# Patient Record
(demographics unavailable — no encounter records)

---

## 2025-02-24 NOTE — DVHOP2
Operative Report - 2


Report Details


Date:


2/24/25


Preop Diagnosis:


Right 1st 2nd 3rd and 5th toe dry gangrene, left 2nd and 3rd toe gangrene


Postop Diagnosis:  


toe gangrene


Surgeon:


Leonard Quiles MD


Anesthesiologist:


General endotracheal tube


Anesthesia:  General


Consent:


The patient was informed of the risks and benefits of the procedure. These 

include but are not limited to death complications of anesthesia, postoperative 

infection, incomplete relief of symptoms, recurrence of symptoms, damage to 

blood vessels, nerves and tendons, deep venous thrombosis, pulmonary embolism 

and possible need for repeat surgery in the future.


Estimated Blood Loss:


25 mL


Indications for Surgery:


See preop diagnosis





Name of Procedure Performed


Left 2nd and 3rd toe amputation right 1st 2nd 3rd and 4th toe amputation.  First

metatarsal head amputation right side





Procedure Details


Procedure Details:


Patient was identified in the preop holding area at this time who was consented 

and preop by myself he was brought back to the operating room placed the 

operating table in supine position after adequate induction of anesthesia antibi

otics and time-out.  The right and left foot were prepped and draped in normal 

surgical fashion.  The right foot was addressed 1st the 1st 2nd and 3rd toe were

amputated EN bloc down to healthy bleeding tissue which was controlled with 

Bovie cauterization all necrotic tissue was removed the 1st metatarsal head was 

then amputated with bone saw.  The wound was then irrigated out and was closed 

with a deep layer of 3-0 Vicryl sutures followed by skin closure with 

interrupted transverse Prolene 2-0 suture interrupted.  The 5th toe at the tip 

was dry gangrene this was amputated down to the metatarsal phalanges joint.  The

tissue was healthy and bleeding was controlled with Bovie cauterization.  Skin 

was closed with 2-0 Prolene sutures interrupted.  Attention was then placed to 

the left foot where the 2nd and 3rd toe were amputated at the 

metatarsophalangeal joints separately.  Wounds were irrigated out and healthy 

bleeding was noted it was controlled with the Bovie cauterization.  This skin 

was then closed with 2-0 Prolene sutures interruptedly.  Both legs were then 

addressed with Kerlix Ace wraps patient tolerated procedure well was taken to 

PACU in stable condition dictation complete.


Specimen:


Right 1st 2nd 3rd toe and 1st metatarsal head


Right 5th toe


Left 2nd toe


Left 3rd toe





Disposition








 Home

















LEONARD QUILES Jr., MD    Feb 24, 2025 12:30